# Patient Record
Sex: MALE | Race: WHITE | NOT HISPANIC OR LATINO | Employment: OTHER | URBAN - METROPOLITAN AREA
[De-identification: names, ages, dates, MRNs, and addresses within clinical notes are randomized per-mention and may not be internally consistent; named-entity substitution may affect disease eponyms.]

---

## 2023-11-13 ENCOUNTER — APPOINTMENT (OUTPATIENT)
Dept: OTOLARYNGOLOGY | Facility: CLINIC | Age: 63
End: 2023-11-13
Payer: COMMERCIAL

## 2023-12-13 ENCOUNTER — APPOINTMENT (OUTPATIENT)
Dept: DERMATOLOGY | Facility: CLINIC | Age: 63
End: 2023-12-13
Payer: COMMERCIAL

## 2024-01-15 ENCOUNTER — APPOINTMENT (OUTPATIENT)
Dept: OTOLARYNGOLOGY | Facility: CLINIC | Age: 64
End: 2024-01-15
Payer: COMMERCIAL

## 2024-01-19 ENCOUNTER — APPOINTMENT (OUTPATIENT)
Dept: AUDIOLOGY | Facility: CLINIC | Age: 64
End: 2024-01-19
Payer: COMMERCIAL

## 2024-01-24 ENCOUNTER — APPOINTMENT (OUTPATIENT)
Dept: AUDIOLOGY | Facility: CLINIC | Age: 64
End: 2024-01-24
Payer: COMMERCIAL

## 2024-02-01 ENCOUNTER — APPOINTMENT (OUTPATIENT)
Dept: AUDIOLOGY | Facility: CLINIC | Age: 64
End: 2024-02-01
Payer: COMMERCIAL

## 2024-03-11 ENCOUNTER — OFFICE VISIT (OUTPATIENT)
Dept: OTOLARYNGOLOGY | Facility: CLINIC | Age: 64
End: 2024-03-11
Payer: COMMERCIAL

## 2024-03-11 VITALS — WEIGHT: 191.6 LBS | BODY MASS INDEX: 33.95 KG/M2 | HEIGHT: 63 IN

## 2024-03-11 DIAGNOSIS — R49.0 HOARSENESS OF VOICE: Primary | ICD-10-CM

## 2024-03-11 DIAGNOSIS — J38.01 PARESIS OF RIGHT VOCAL CORD: ICD-10-CM

## 2024-03-11 DIAGNOSIS — R09.89 CHRONIC THROAT CLEARING: ICD-10-CM

## 2024-03-11 PROCEDURE — 31579 LARYNGOSCOPY TELESCOPIC: CPT | Performed by: OTOLARYNGOLOGY

## 2024-03-11 PROCEDURE — 1036F TOBACCO NON-USER: CPT | Performed by: OTOLARYNGOLOGY

## 2024-03-11 RX ORDER — ATORVASTATIN CALCIUM 80 MG/1
80 TABLET, FILM COATED ORAL
COMMUNITY
Start: 2017-10-27

## 2024-03-11 RX ORDER — TOBRAMYCIN AND DEXAMETHASONE 3; 1 MG/ML; MG/ML
1 SUSPENSION/ DROPS OPHTHALMIC
COMMUNITY
Start: 2023-07-02

## 2024-03-11 RX ORDER — ELECTROLYTES/DEXTROSE
1 SOLUTION, ORAL ORAL EVERY 24 HOURS
COMMUNITY

## 2024-03-11 RX ORDER — TIRZEPATIDE 2.5 MG/.5ML
INJECTION, SOLUTION SUBCUTANEOUS
COMMUNITY

## 2024-03-11 RX ORDER — CYCLOSPORINE 0 G/ML
1 SOLUTION/ DROPS OPHTHALMIC; TOPICAL
COMMUNITY
Start: 2023-07-23

## 2024-03-11 RX ORDER — TADALAFIL 5 MG/1
TABLET ORAL EVERY 24 HOURS
COMMUNITY

## 2024-03-11 RX ORDER — OMEGA-3-ACID ETHYL ESTERS 1 G/1
1 CAPSULE, LIQUID FILLED ORAL 2 TIMES DAILY
COMMUNITY

## 2024-03-11 RX ORDER — MONTELUKAST SODIUM 4 MG/1
1 TABLET, CHEWABLE ORAL DAILY
COMMUNITY

## 2024-03-11 RX ORDER — GUAIFENESIN 600 MG/1
600 TABLET, EXTENDED RELEASE ORAL EVERY 12 HOURS
COMMUNITY

## 2024-03-11 RX ORDER — CETIRIZINE HYDROCHLORIDE 10 MG/1
10 TABLET ORAL
COMMUNITY
Start: 2016-01-05

## 2024-03-11 RX ORDER — PREDNISONE 10 MG/1
TABLET ORAL EVERY 24 HOURS
COMMUNITY
Start: 2019-10-08

## 2024-03-11 RX ORDER — BUDESONIDE 0.5 MG/2ML
0.5 INHALANT ORAL EVERY 12 HOURS
COMMUNITY
Start: 2023-11-16

## 2024-03-11 RX ORDER — TIZANIDINE HYDROCHLORIDE 4 MG/1
4 CAPSULE, GELATIN COATED ORAL 3 TIMES DAILY
COMMUNITY
Start: 2023-07-03

## 2024-03-11 RX ORDER — ALUMINUM ZIRCONIUM OCTACHLOROHYDREX GLY 16 G/100G
GEL TOPICAL
COMMUNITY

## 2024-03-11 RX ORDER — ASCORBIC ACID 500 MG
1000 TABLET ORAL
COMMUNITY
Start: 2004-07-02

## 2024-03-11 RX ORDER — ACETAMINOPHEN 500 MG
2000 TABLET ORAL
COMMUNITY
Start: 2016-04-20

## 2024-03-11 RX ORDER — ALBUTEROL SULFATE 90 UG/1
1 AEROSOL, METERED RESPIRATORY (INHALATION)
COMMUNITY
Start: 2001-10-09

## 2024-03-11 RX ORDER — EPINEPHRINE 0.3 MG/.3ML
1 INJECTION INTRAMUSCULAR ONCE AS NEEDED
COMMUNITY
Start: 2020-10-26

## 2024-03-11 RX ORDER — DOXYCYCLINE 100 MG/1
100 CAPSULE ORAL
COMMUNITY
Start: 2022-03-17

## 2024-03-11 RX ORDER — CICLOPIROX 1 G/100ML
5 SHAMPOO TOPICAL
COMMUNITY
Start: 2015-04-02

## 2024-03-11 RX ORDER — LANOLIN ALCOHOL/MO/W.PET/CERES
1000 CREAM (GRAM) TOPICAL
COMMUNITY
Start: 2022-03-03

## 2024-03-11 RX ORDER — DICLOFENAC SODIUM 10 MG/G
2 GEL TOPICAL 2 TIMES DAILY PRN
COMMUNITY

## 2024-03-11 RX ORDER — FLUTICASONE FUROATE 27.5 UG/1
1 SPRAY, METERED NASAL EVERY 24 HOURS
COMMUNITY
Start: 2015-05-27

## 2024-03-11 RX ORDER — HYDROCODONE POLISTIREX AND CHLORPHENIRAMINE POLISTIREX 10; 8 MG/5ML; MG/5ML
5 SUSPENSION, EXTENDED RELEASE ORAL
COMMUNITY
Start: 2011-08-17

## 2024-03-11 RX ORDER — KETOCONAZOLE 20 MG/G
CREAM TOPICAL DAILY
COMMUNITY
Start: 2016-04-07

## 2024-03-11 RX ORDER — NITROGLYCERIN 0.4 MG/1
0.4 TABLET SUBLINGUAL
COMMUNITY
Start: 2017-10-28

## 2024-03-11 RX ORDER — FAMOTIDINE 40 MG/1
40 TABLET, FILM COATED ORAL
COMMUNITY

## 2024-03-11 RX ORDER — PROMETHAZINE HYDROCHLORIDE AND DEXTROMETHORPHAN HYDROBROMIDE 6.25; 15 MG/5ML; MG/5ML
SYRUP ORAL
COMMUNITY
Start: 2018-03-22

## 2024-03-11 RX ORDER — NIACIN (INOSITOL NIACINATE) 400(500MG)
CAPSULE ORAL
COMMUNITY

## 2024-03-11 RX ORDER — OMEPRAZOLE 40 MG/1
40 CAPSULE, DELAYED RELEASE ORAL
COMMUNITY

## 2024-03-11 RX ORDER — CHLORPHENIRAMINE MALEATE 4 MG
4 TABLET ORAL DAILY PRN
COMMUNITY
Start: 2022-07-19

## 2024-03-11 RX ORDER — ALBUTEROL SULFATE 0.83 MG/ML
2.5 SOLUTION RESPIRATORY (INHALATION)
COMMUNITY
Start: 2017-04-18

## 2024-03-11 RX ORDER — AMMONIUM LACTATE 12 G/100G
LOTION TOPICAL AS NEEDED
COMMUNITY
Start: 2023-11-24

## 2024-03-11 RX ORDER — BUMETANIDE 2 MG/1
TABLET ORAL
COMMUNITY

## 2024-03-11 RX ORDER — MONTELUKAST SODIUM 10 MG/1
10 TABLET ORAL NIGHTLY
COMMUNITY
Start: 2015-01-12

## 2024-03-11 RX ORDER — IPRATROPIUM BROMIDE AND ALBUTEROL SULFATE 2.5; .5 MG/3ML; MG/3ML
SOLUTION RESPIRATORY (INHALATION) EVERY 4 HOURS
COMMUNITY
Start: 2023-11-24

## 2024-03-11 RX ORDER — BENZONATATE 200 MG/1
200 CAPSULE ORAL 3 TIMES DAILY PRN
COMMUNITY
Start: 2022-03-17

## 2024-03-11 ASSESSMENT — PATIENT HEALTH QUESTIONNAIRE - PHQ9
SUM OF ALL RESPONSES TO PHQ9 QUESTIONS 1 AND 2: 0
1. LITTLE INTEREST OR PLEASURE IN DOING THINGS: NOT AT ALL
2. FEELING DOWN, DEPRESSED OR HOPELESS: NOT AT ALL

## 2024-03-11 NOTE — PROGRESS NOTES
Chief Complaint  Chief Complaint   Patient presents with    Follow-up     6-8 follow up visit.        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     Interval History (2023):   Since last visit he had COVID in 2024. He has persistent fatigue due to this. He had a concussion in 2023. He left his job as a . At this time, he has noticed low voice demand. He is using a microphone when he talks. He has persistent hoarseness and pitch breaks in the upper registers when singing. He is not sure if this is due to COVID or his baseline voice issues.    He has postnasal drip. He is performing speech therapy techniques and seltzer gargles. When the secretions are severe, he is able to expectorate this.   He had an EGD.     Exam:  VOICE: Mild hoarseness near normal   RESPIRATION: Breathing comfortably, no stridor.    ORAL CAVITY/OROPHARYNX/LIPS: Normal mucous membranes, normal floor of mouth/tongue/OP, no masses or lesions are noted.    SKIN: Neck skin is without scar or injury.    PSYCH: Alert and oriented with appropriate mood and affect.       PROCEDURE NOTE:  Recommended stroboscopy.  Risks, benefits,  and alternatives were explained. They wished to proceed and provides verbal consent.     PROCEDURE:  Flexible laryngoscopy with stroboscopy, CPT 49682     POSTPROCEDURE DIAGNOSIS: Hoarseness     INDICATIONS: Inability to tolerate mirror exam or abnormal findings on mirror,Stroboscopy performed to assess one of the followin. Diagnosis of symptomatic disorder involving the voice, swallow, upper aerodigestive tract, including VALERIE disorders, or  2. Preoperative evaluation of vocal cord function for individuals undergoing surgery where the RLN or vagus nerves are at risk of injury, or  3. Further evaluation of abnormalities of the upper aerodigestive tract discovered by another modality, such as CT, MRI, bronchoscopy or EGD    Description of Procedure:    After  adequate afrin and lidocaine spray, I advanced the endoscope.  Visualization of the nasopharynx, vallecula, posterior pharyngeal walls, pyriform, epiglottis and post cricoid areas was unremarkable.  The following laryngeal findings were noted:    Vocal cord movement is asymmetric, right is improved  blunted posterior pharyngeal wall likely from a spur/osteophyte   Closure is complete   Posterior vocal cord erythema is improved  Mucosal wave amplitude is increased on the R > L- this is improved from prior   Periodic/symmetric mucosal wave   Pharyngeal wall contraction is normal   No pooling of secretions     Procedure well tolerated.    Assessment and Plan:  This is a follow up visit for chronic hoarseness with clinical findings of a subclinical right vocal cord weakness and posterior vocal cord edema with compensatory MTD.  Today's exam is unchanged compared to his last one. This is assuring that his current symptoms are likely exacerbated and secondary to postviral effect. I am overall encouraged by the     We discussed:  He will work with SLP on reconditioning for his singing. At this time, there are no limitations in his voice. He was advised to sing in a register where he is comfortable without exerting effort.   He was advised to start gargling with seltzer water and increase hydration as the day goes on.   He will follow up as needed.     The patient's questions were answered.    Scribe Attestation  By signing my name below, I, Daniella Lopez , Scribe attest that this documentation has been prepared under the direction and in the presence of Amie Duarte MD.

## 2024-03-18 ENCOUNTER — APPOINTMENT (OUTPATIENT)
Dept: SPEECH THERAPY | Facility: HOSPITAL | Age: 64
End: 2024-03-18
Payer: COMMERCIAL

## 2024-03-29 ENCOUNTER — CLINICAL SUPPORT (OUTPATIENT)
Dept: AUDIOLOGY | Facility: CLINIC | Age: 64
End: 2024-03-29
Payer: COMMERCIAL

## 2024-03-29 DIAGNOSIS — H90.3 SENSORINEURAL HEARING LOSS, BILATERAL: Primary | ICD-10-CM

## 2024-03-29 PROCEDURE — V5299 HEARING SERVICE: HCPCS | Performed by: AUDIOLOGIST

## 2024-03-29 NOTE — PROGRESS NOTES
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 Also needs a clean and check.    EVALUATION  Otoscopy revealed clear ear canals and tympanic membrane visualized, bilaterally.    Updated software in [employer], changed receivers from MAV to 0 moderate. 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    IMPRESSIONS:  Cleaned hearing aids changed receivers listening check good. Consider increased gain if acceptable to patient. 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    RECOMMENDATIONS:  Continue medical follow-up with physician.  Return for audiologic assessment in conjunction with otologic care or annually.   Return for hearing aid check as needed.    PATIENT EDUCATION:   Discussed results and recommendations with Wilbert Lobo.  Questions were addressed and the patient was encouraged to contact our department ([phone]) should concerns arise.    RAFIA Ruiz, CCC-A  Senior Clinical Audiologist    TIME: [phone]

## 2024-04-26 ENCOUNTER — CLINICAL SUPPORT (OUTPATIENT)
Dept: AUDIOLOGY | Facility: CLINIC | Age: 64
End: 2024-04-26
Payer: COMMERCIAL

## 2024-04-26 DIAGNOSIS — H90.3 SENSORINEURAL HEARING LOSS, BILATERAL: Primary | ICD-10-CM

## 2024-04-26 PROCEDURE — V5299 HEARING SERVICE: HCPCS | Performed by: AUDIOLOGIST

## 2024-04-26 NOTE — PROGRESS NOTES
HA CHECK     Ear Make and Model Serial Number /  Tube size Dome Warranty Expiration   Right Phonak Audeo L90 R [id number]  0 moderate medium open 11/26/2025   Left Phonak Audeo L90 R [id number] 0 moderate medium open 11/26/2025       HISTORY  Patient arrived today for hearing aid check reporting needing more treble in streaming programs (music sounds dull), continued difficulty hearing soft speech in autosense program. And left device occasionally working out of the canal.    EVALUATION  Otoscopy revealed clear ear canals and tympanic membrane visualized, bilaterally.    Increased mid and high frequencies in streaming and autosense programs. Decreased gain for 80 dB inputs as his voice is sounding distorted. Added power domes to use while streaming music to get the best sound quality. Added retention line on left device.    IMPRESSIONS:  Increased gain in mid and high frequencies for streaming and autosense program. Use power domes when streaming to allow for more access to the full range of frequencies of music/sound. Can adjust frequencies in ayaka in custom programs but not in streaming programs.     RECOMMENDATIONS:  Continue medical follow-up with physician.  Return for audiologic assessment in conjunction with otologic care or annually.   Return for hearing aid check as needed.    PATIENT EDUCATION:   Discussed results and recommendations with Wilbert Lobo.  Questions were addressed and the patient was encouraged to contact our department ([phone]) should concerns arise.    RAFIA Ruiz, CCC-A  Senior Clinical Audiologist    TIME: [phone]

## 2024-06-25 PROBLEM — R93.3 ABNORMAL UGI SERIES: Status: ACTIVE | Noted: 2024-03-02

## 2024-06-25 PROBLEM — E78.00 HIGH CHOLESTEROL: Status: ACTIVE | Noted: 2019-08-29

## 2024-06-25 PROBLEM — J38.4 VOCAL CORD EDEMA: Status: ACTIVE | Noted: 2024-01-31

## 2024-06-25 PROBLEM — I51.9 HEART DISEASE: Status: ACTIVE | Noted: 2024-01-31

## 2024-06-25 PROBLEM — J30.1 ALLERGIC RHINITIS DUE TO POLLEN: Status: ACTIVE | Noted: 2024-01-31

## 2024-06-25 PROBLEM — R53.83 FATIGUE: Status: ACTIVE | Noted: 2024-01-31

## 2024-06-25 PROBLEM — H93.13 TINNITUS OF BOTH EARS: Status: ACTIVE | Noted: 2024-01-31

## 2024-06-25 PROBLEM — K29.30 CHRONIC SUPERFICIAL GASTRITIS WITHOUT BLEEDING: Status: ACTIVE | Noted: 2024-03-02

## 2024-06-25 PROBLEM — M35.9 AUTOIMMUNE DISEASE (MULTI): Status: ACTIVE | Noted: 2024-01-31

## 2024-06-25 PROBLEM — K57.10 DUODENAL DIVERTICULUM: Status: ACTIVE | Noted: 2024-03-05

## 2024-06-25 RX ORDER — METFORMIN HYDROCHLORIDE 500 MG/1
1000 TABLET, EXTENDED RELEASE ORAL 2 TIMES DAILY
COMMUNITY
Start: 2024-04-18

## 2024-07-18 ENCOUNTER — OFFICE VISIT (OUTPATIENT)
Dept: RHEUMATOLOGY | Facility: CLINIC | Age: 64
End: 2024-07-18
Payer: COMMERCIAL

## 2024-07-18 VITALS
HEIGHT: 63 IN | BODY MASS INDEX: 35.43 KG/M2 | OXYGEN SATURATION: 96 % | HEART RATE: 67 BPM | SYSTOLIC BLOOD PRESSURE: 101 MMHG | WEIGHT: 199.96 LBS | DIASTOLIC BLOOD PRESSURE: 61 MMHG

## 2024-07-18 DIAGNOSIS — R53.83 FATIGUE, UNSPECIFIED TYPE: ICD-10-CM

## 2024-07-18 DIAGNOSIS — E55.9 VITAMIN D DEFICIENCY: ICD-10-CM

## 2024-07-18 DIAGNOSIS — M06.09 POLYARTHRITIS WITH NEGATIVE RHEUMATOID FACTOR (MULTI): ICD-10-CM

## 2024-07-18 DIAGNOSIS — R76.8 POSITIVE ANA (ANTINUCLEAR ANTIBODY): Primary | ICD-10-CM

## 2024-07-18 PROCEDURE — 99215 OFFICE O/P EST HI 40 MIN: CPT | Performed by: INTERNAL MEDICINE

## 2024-07-18 PROCEDURE — 3008F BODY MASS INDEX DOCD: CPT | Performed by: INTERNAL MEDICINE

## 2024-07-18 PROCEDURE — 99205 OFFICE O/P NEW HI 60 MIN: CPT | Performed by: INTERNAL MEDICINE

## 2024-07-18 RX ORDER — ASPIRIN 81 MG/1
81 TABLET ORAL DAILY
COMMUNITY

## 2024-07-18 RX ORDER — LIFITEGRAST 50 MG/ML
1 SOLUTION/ DROPS OPHTHALMIC 2 TIMES DAILY
COMMUNITY

## 2024-07-18 RX ORDER — DICLOFENAC SODIUM 30 MG/G
GEL TOPICAL 2 TIMES DAILY PRN
Qty: 100 G | Refills: 5 | Status: SHIPPED | OUTPATIENT
Start: 2024-07-18

## 2024-07-18 RX ORDER — SULFASALAZINE 500 MG/1
500 TABLET, DELAYED RELEASE ORAL 2 TIMES DAILY
Qty: 60 TABLET | Refills: 5 | Status: SHIPPED | OUTPATIENT
Start: 2024-07-18 | End: 2024-07-19

## 2024-07-18 ASSESSMENT — ROUTINE ASSESSMENT OF PATIENT INDEX DATA (RAPID3)
TOTAL RAPID3 SCORE: 11.7
SEVERITY_SCORE: 0
SUM OF QUESTIONS A TO J: 8
ON A SCALE OF ONE TO TEN, CONSIDERING ALL THE WAYS IN WHICH ILLNESS AND HEALTH CONDITIONS MAY AFFECT YOU AT THIS TIME, PLEASE INDICATE BELOW HOW YOU ARE DOING:: 5
PARTIPATE_RECREATIONAL_ACTIVITIES: WITH MUCH DIFFICULTY
FN_SCORE: 2.7
WEIGHTED_TOTAL_SCORE: 3.9
PICK_CLOTHES_OFF_FLOOR: WITH MUCH DIFFICULTY
WASH_DRY_BODY: WITH SOME DIFFICULTY
ON A SCALE OF ONE TO TEN, HOW MUCH PAIN HAVE YOU HAD BECAUSE OF YOUR CONDITION OVER THE PAST WEEK?: 4
LIFT_CUP_TO_MOUTH: WITHOUT ANY DIFFICULTY
ON A SCALE OF ONE TO TEN, HOW MUCH PAIN HAVE YOU HAD BECAUSE OF YOUR CONDITION OVER THE PAST WEEK?: 4
FEELINGS_ANXIETY_NERVOUS: WITHOUT ANY DIFFICULTY
WALK_FLAT_GROUND: WITHOUT ANY DIFFICULTY
ON A SCALE OF ONE TO TEN, CONSIDERING ALL THE WAYS IN WHICH ILLNESS AND HEALTH CONDITIONS MAY AFFECT YOU AT THIS TIME, PLEASE INDICATE BELOW HOW YOU ARE DOING:: 5
WALK_KILOMETERS: WITH MUCH DIFFICULTY
FEELINGS_DEPRESSION: WITHOUT ANY DIFFICULTY
IN_OUT_TRANSPORT: WITHOUT ANY DIFFICULTY
IN_OUT_BED: WITHOUT ANY DIFFICULTY
TURN_FAUCETS_OFF: WITHOUT ANY DIFFICULTY
GOOD_NIGHTS_SLEEP: WITH SOME DIFFICULTY
SEVERITY_SCORE: MODERATE SEVERITY (MS)
DRESS_YOURSELF: WITH SOME DIFFICULTY

## 2024-07-18 ASSESSMENT — PAIN - FUNCTIONAL ASSESSMENT: PAIN_FUNCTIONAL_ASSESSMENT: 0-10

## 2024-07-18 ASSESSMENT — PATIENT HEALTH QUESTIONNAIRE - PHQ9
1. LITTLE INTEREST OR PLEASURE IN DOING THINGS: NOT AT ALL
SUM OF ALL RESPONSES TO PHQ9 QUESTIONS 1 AND 2: 0
2. FEELING DOWN, DEPRESSED OR HOPELESS: NOT AT ALL

## 2024-07-18 ASSESSMENT — ENCOUNTER SYMPTOMS
LOSS OF SENSATION IN FEET: 0
DEPRESSION: 0
OCCASIONAL FEELINGS OF UNSTEADINESS: 0

## 2024-07-18 ASSESSMENT — PAIN SCALES - GENERAL: PAINLEVEL_OUTOF10: 4
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
## 2024-07-19 RX ORDER — SULFASALAZINE 500 MG/1
TABLET, DELAYED RELEASE ORAL
Qty: 180 TABLET | Refills: 3 | Status: SHIPPED | OUTPATIENT
Start: 2024-07-19
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
## 2024-08-12 ENCOUNTER — APPOINTMENT (OUTPATIENT)
Dept: OTOLARYNGOLOGY | Facility: CLINIC | Age: 64
End: 2024-08-12
Payer: COMMERCIAL

## 2024-08-12 VITALS — BODY MASS INDEX: 33.8 KG/M2 | HEIGHT: 64 IN | WEIGHT: 198 LBS

## 2024-08-12 DIAGNOSIS — R09.89 CHRONIC THROAT CLEARING: ICD-10-CM

## 2024-08-12 DIAGNOSIS — K21.9 GASTROESOPHAGEAL REFLUX DISEASE WITHOUT ESOPHAGITIS: ICD-10-CM

## 2024-08-12 DIAGNOSIS — J30.1 SEASONAL ALLERGIC RHINITIS DUE TO POLLEN: ICD-10-CM

## 2024-08-12 DIAGNOSIS — H93.13 TINNITUS OF BOTH EARS: ICD-10-CM

## 2024-08-12 DIAGNOSIS — J38.01 PARESIS OF RIGHT VOCAL CORD: Primary | ICD-10-CM

## 2024-08-12 DIAGNOSIS — R49.0 HOARSENESS OF VOICE: ICD-10-CM

## 2024-08-12 PROBLEM — D17.21 BENIGN LIPOMATOUS NEOPLASM OF SKIN AND SUBCUTANEOUS TISSUE OF RIGHT ARM: Status: ACTIVE | Noted: 2020-11-03

## 2024-08-12 PROBLEM — G25.81 RESTLESS LEGS: Status: ACTIVE | Noted: 2024-08-12

## 2024-08-12 PROBLEM — K50.90 CROHN'S DISEASE (MULTI): Status: ACTIVE | Noted: 2017-10-24

## 2024-08-12 PROBLEM — B02.9 HERPES ZOSTER WITHOUT COMPLICATION: Status: ACTIVE | Noted: 2017-10-25

## 2024-08-12 PROBLEM — J45.20 MILD INTERMITTENT ASTHMA (HHS-HCC): Status: ACTIVE | Noted: 2024-01-31

## 2024-08-12 PROBLEM — L29.9 PRURITUS, UNSPECIFIED: Status: ACTIVE | Noted: 2020-11-03

## 2024-08-12 PROBLEM — D48.5 NEOPLASM OF UNCERTAIN BEHAVIOR OF SKIN: Status: ACTIVE | Noted: 2020-11-03

## 2024-08-12 PROBLEM — M54.50 LOW BACK PAIN: Status: ACTIVE | Noted: 2018-10-30

## 2024-08-12 PROBLEM — N52.1 ERECTILE DYSFUNCTION DUE TO DISEASES CLASSIFIED ELSEWHERE: Status: ACTIVE | Noted: 2022-12-12

## 2024-08-12 PROBLEM — I25.2 HISTORY OF MYOCARDIAL INFARCTION: Status: ACTIVE | Noted: 2024-01-31

## 2024-08-12 PROBLEM — H90.3 ASYMMETRICAL SENSORINEURAL HEARING LOSS: Status: ACTIVE | Noted: 2024-08-12

## 2024-08-12 PROBLEM — K50.90 ARTHRITIS IN CROHN'S DISEASE (MULTI): Status: ACTIVE | Noted: 2018-01-31

## 2024-08-12 PROBLEM — D84.9 IMMUNOCOMPROMISED STATE (MULTI): Status: ACTIVE | Noted: 2018-01-31

## 2024-08-12 PROBLEM — Z98.890 HISTORY OF NISSEN FUNDOPLICATION: Status: ACTIVE | Noted: 2024-03-02

## 2024-08-12 PROBLEM — R73.03 PRE-DIABETES: Status: ACTIVE | Noted: 2021-10-18

## 2024-08-12 PROBLEM — I83.028: Status: ACTIVE | Noted: 2020-11-03

## 2024-08-12 PROBLEM — Z86.69 HISTORY OF HEARING LOSS: Status: ACTIVE | Noted: 2024-08-12

## 2024-08-12 PROBLEM — Z91.013: Status: ACTIVE | Noted: 2018-03-25

## 2024-08-12 PROBLEM — H61.20 EXCESSIVE CERUMEN IN EAR CANAL: Status: ACTIVE | Noted: 2024-08-12

## 2024-08-12 PROBLEM — L60.3 NAIL DYSTROPHY: Status: ACTIVE | Noted: 2020-11-03

## 2024-08-12 PROBLEM — I87.2 CHRONIC VENOUS INSUFFICIENCY: Status: ACTIVE | Noted: 2020-11-03

## 2024-08-12 PROBLEM — S00.432A: Status: ACTIVE | Noted: 2024-08-12

## 2024-08-12 PROBLEM — J45.40 MODERATE PERSISTENT ASTHMA WITHOUT COMPLICATION (HHS-HCC): Status: ACTIVE | Noted: 2018-03-25

## 2024-08-12 PROBLEM — E66.9 OBESITY WITH BODY MASS INDEX 30 OR GREATER: Status: ACTIVE | Noted: 2024-08-12

## 2024-08-12 PROBLEM — I10 ESSENTIAL HYPERTENSION: Status: ACTIVE | Noted: 2020-10-09

## 2024-08-12 PROBLEM — E66.01 MORBID OBESITY DUE TO EXCESS CALORIES (MULTI): Status: ACTIVE | Noted: 2022-11-03

## 2024-08-12 PROBLEM — H72.92 PERFORATION OF LEFT TYMPANIC MEMBRANE: Status: ACTIVE | Noted: 2024-01-31

## 2024-08-12 PROBLEM — L29.1 SCROTAL ITCHING: Status: ACTIVE | Noted: 2022-11-30

## 2024-08-12 PROBLEM — H61.22 IMPACTED CERUMEN OF LEFT EAR: Status: ACTIVE | Noted: 2024-01-31

## 2024-08-12 PROBLEM — I87.2 CHRONIC STASIS DERMATITIS: Status: ACTIVE | Noted: 2019-05-01

## 2024-08-12 PROBLEM — M07.60 ARTHRITIS IN CROHN'S DISEASE (MULTI): Status: ACTIVE | Noted: 2018-01-31

## 2024-08-12 PROBLEM — M85.80 OSTEOPENIA DETERMINED BY X-RAY: Status: ACTIVE | Noted: 2018-01-31

## 2024-08-12 PROBLEM — D22.5 MELANOCYTIC NEVI OF TRUNK: Status: ACTIVE | Noted: 2020-11-03

## 2024-08-12 PROBLEM — I21.4 NSTEMI (NON-ST ELEVATED MYOCARDIAL INFARCTION) (MULTI): Status: ACTIVE | Noted: 2017-10-25

## 2024-08-12 PROBLEM — R33.9 URINARY RETENTION: Status: ACTIVE | Noted: 2022-11-30

## 2024-08-12 PROBLEM — R39.12 WEAK URINARY STREAM: Status: ACTIVE | Noted: 2021-01-23

## 2024-08-12 PROBLEM — J34.89 NASAL DISCHARGE: Status: ACTIVE | Noted: 2024-01-31

## 2024-08-12 PROBLEM — R07.9 CHEST PAIN: Status: ACTIVE | Noted: 2017-10-24

## 2024-08-12 PROBLEM — R30.0 DYSURIA: Status: ACTIVE | Noted: 2021-01-23

## 2024-08-12 PROBLEM — J30.9 ALLERGIC RHINITIS: Status: ACTIVE | Noted: 2018-03-25

## 2024-08-12 PROCEDURE — 1036F TOBACCO NON-USER: CPT | Performed by: OTOLARYNGOLOGY

## 2024-08-12 PROCEDURE — 3008F BODY MASS INDEX DOCD: CPT | Performed by: OTOLARYNGOLOGY

## 2024-08-12 PROCEDURE — 99214 OFFICE O/P EST MOD 30 MIN: CPT | Performed by: OTOLARYNGOLOGY

## 2024-08-22 ENCOUNTER — OFFICE VISIT (OUTPATIENT)
Dept: OTOLARYNGOLOGY | Facility: CLINIC | Age: 64
End: 2024-08-22
Payer: COMMERCIAL

## 2024-08-22 VITALS — HEIGHT: 63 IN | BODY MASS INDEX: 33.66 KG/M2 | WEIGHT: 190 LBS

## 2024-08-22 DIAGNOSIS — R04.0 EPISTAXIS: Primary | ICD-10-CM

## 2024-08-22 DIAGNOSIS — J34.89 NASAL LESION: ICD-10-CM

## 2024-08-22 PROCEDURE — 3008F BODY MASS INDEX DOCD: CPT | Performed by: STUDENT IN AN ORGANIZED HEALTH CARE EDUCATION/TRAINING PROGRAM

## 2024-08-22 PROCEDURE — 99214 OFFICE O/P EST MOD 30 MIN: CPT | Performed by: STUDENT IN AN ORGANIZED HEALTH CARE EDUCATION/TRAINING PROGRAM

## 2024-08-22 PROCEDURE — 31231 NASAL ENDOSCOPY DX: CPT | Performed by: STUDENT IN AN ORGANIZED HEALTH CARE EDUCATION/TRAINING PROGRAM

## 2024-08-22 PROCEDURE — 1036F TOBACCO NON-USER: CPT | Performed by: STUDENT IN AN ORGANIZED HEALTH CARE EDUCATION/TRAINING PROGRAM

## 2024-08-22 RX ORDER — AMOXICILLIN 500 MG/1
1 CAPSULE ORAL
COMMUNITY
Start: 2024-08-20

## 2024-08-22 RX ORDER — MUPIROCIN 20 MG/G
OINTMENT TOPICAL
Qty: 22 G | Refills: 0 | Status: SHIPPED | OUTPATIENT
Start: 2024-08-22 | End: 2024-09-01
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
## 2024-09-20 ENCOUNTER — APPOINTMENT (OUTPATIENT)
Dept: AUDIOLOGY | Facility: CLINIC | Age: 64
End: 2024-09-20
Payer: COMMERCIAL

## 2024-09-23 ENCOUNTER — APPOINTMENT (OUTPATIENT)
Dept: OTOLARYNGOLOGY | Facility: CLINIC | Age: 64
End: 2024-09-23
Payer: COMMERCIAL

## 2024-10-07 ENCOUNTER — APPOINTMENT (OUTPATIENT)
Dept: OTOLARYNGOLOGY | Facility: CLINIC | Age: 64
End: 2024-10-07
Payer: COMMERCIAL

## 2024-10-07 VITALS — WEIGHT: 190 LBS | HEIGHT: 63 IN | BODY MASS INDEX: 33.66 KG/M2

## 2024-10-07 DIAGNOSIS — R04.0 EPISTAXIS: Primary | ICD-10-CM

## 2024-10-07 DIAGNOSIS — J31.0 CHRONIC RHINITIS: ICD-10-CM

## 2024-10-07 DIAGNOSIS — H90.12 CONDUCTIVE HEARING LOSS OF LEFT EAR, UNSPECIFIED HEARING STATUS ON CONTRALATERAL SIDE: ICD-10-CM

## 2024-10-07 DIAGNOSIS — H61.22 IMPACTED CERUMEN OF LEFT EAR: ICD-10-CM

## 2024-10-07 PROCEDURE — 99213 OFFICE O/P EST LOW 20 MIN: CPT | Performed by: STUDENT IN AN ORGANIZED HEALTH CARE EDUCATION/TRAINING PROGRAM

## 2024-10-07 PROCEDURE — 3008F BODY MASS INDEX DOCD: CPT | Performed by: STUDENT IN AN ORGANIZED HEALTH CARE EDUCATION/TRAINING PROGRAM

## 2024-10-07 PROCEDURE — 69210 REMOVE IMPACTED EAR WAX UNI: CPT | Performed by: STUDENT IN AN ORGANIZED HEALTH CARE EDUCATION/TRAINING PROGRAM

## 2024-10-07 PROCEDURE — 1159F MED LIST DOCD IN RCRD: CPT | Performed by: STUDENT IN AN ORGANIZED HEALTH CARE EDUCATION/TRAINING PROGRAM

## 2024-10-07 PROCEDURE — 1036F TOBACCO NON-USER: CPT | Performed by: STUDENT IN AN ORGANIZED HEALTH CARE EDUCATION/TRAINING PROGRAM
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
## 2024-10-18 ENCOUNTER — APPOINTMENT (OUTPATIENT)
Dept: AUDIOLOGY | Facility: CLINIC | Age: 64
End: 2024-10-18
Payer: COMMERCIAL

## 2024-10-18 DIAGNOSIS — H90.3 SENSORINEURAL HEARING LOSS, BILATERAL: Primary | ICD-10-CM

## 2024-10-18 PROCEDURE — 92567 TYMPANOMETRY: CPT | Performed by: AUDIOLOGIST

## 2024-10-18 PROCEDURE — V5267 HEARING AID SUP/ACCESS/DEV: HCPCS | Performed by: AUDIOLOGIST

## 2024-10-18 PROCEDURE — 92557 COMPREHENSIVE HEARING TEST: CPT | Performed by: AUDIOLOGIST
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
## 2024-11-04 ENCOUNTER — TELEPHONE (OUTPATIENT)
Dept: RHEUMATOLOGY | Facility: CLINIC | Age: 64
End: 2024-11-04

## 2024-11-04 NOTE — TELEPHONE ENCOUNTER
Pt has an upcoming appointment scheduled on 11/22/2024.  Pt had labs last drawn on 07/30/2024.  Should the pt have more labs drawn before next appointment?  Please advise.     Maxim Cummins MA

## 2024-11-20 ENCOUNTER — APPOINTMENT (OUTPATIENT)
Dept: OTOLARYNGOLOGY | Facility: CLINIC | Age: 64
End: 2024-11-20
Payer: COMMERCIAL

## 2024-11-20 VITALS — WEIGHT: 200 LBS | BODY MASS INDEX: 35.44 KG/M2 | HEIGHT: 63 IN

## 2024-11-20 DIAGNOSIS — H90.3 SENSORINEURAL HEARING LOSS (SNHL) OF BOTH EARS: ICD-10-CM

## 2024-11-20 DIAGNOSIS — J31.0 CHRONIC RHINITIS: Primary | ICD-10-CM

## 2024-11-20 PROCEDURE — 1036F TOBACCO NON-USER: CPT | Performed by: STUDENT IN AN ORGANIZED HEALTH CARE EDUCATION/TRAINING PROGRAM

## 2024-11-20 PROCEDURE — 3008F BODY MASS INDEX DOCD: CPT | Performed by: STUDENT IN AN ORGANIZED HEALTH CARE EDUCATION/TRAINING PROGRAM

## 2024-11-20 PROCEDURE — 1159F MED LIST DOCD IN RCRD: CPT | Performed by: STUDENT IN AN ORGANIZED HEALTH CARE EDUCATION/TRAINING PROGRAM

## 2024-11-20 PROCEDURE — 99213 OFFICE O/P EST LOW 20 MIN: CPT | Performed by: STUDENT IN AN ORGANIZED HEALTH CARE EDUCATION/TRAINING PROGRAM

## 2024-11-20 RX ORDER — IPRATROPIUM BROMIDE 42 UG/1
2 SPRAY, METERED NASAL 3 TIMES DAILY PRN
Qty: 30 ML | Refills: 3 | Status: SHIPPED | OUTPATIENT
Start: 2024-11-20
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
## 2024-11-22 ENCOUNTER — OFFICE VISIT (OUTPATIENT)
Dept: RHEUMATOLOGY | Facility: CLINIC | Age: 64
End: 2024-11-22
Payer: COMMERCIAL

## 2024-11-22 VITALS
DIASTOLIC BLOOD PRESSURE: 54 MMHG | SYSTOLIC BLOOD PRESSURE: 110 MMHG | HEIGHT: 63 IN | BODY MASS INDEX: 36.5 KG/M2 | WEIGHT: 206 LBS | OXYGEN SATURATION: 95 % | HEART RATE: 63 BPM

## 2024-11-22 DIAGNOSIS — M06.09 POLYARTHRITIS WITH NEGATIVE RHEUMATOID FACTOR (MULTI): Primary | ICD-10-CM

## 2024-11-22 PROCEDURE — 99214 OFFICE O/P EST MOD 30 MIN: CPT | Performed by: INTERNAL MEDICINE

## 2024-11-22 PROCEDURE — 3008F BODY MASS INDEX DOCD: CPT | Performed by: INTERNAL MEDICINE

## 2024-11-22 PROCEDURE — 3078F DIAST BP <80 MM HG: CPT | Performed by: INTERNAL MEDICINE

## 2024-11-22 PROCEDURE — 1159F MED LIST DOCD IN RCRD: CPT | Performed by: INTERNAL MEDICINE

## 2024-11-22 PROCEDURE — 3074F SYST BP LT 130 MM HG: CPT | Performed by: INTERNAL MEDICINE

## 2024-11-22 PROCEDURE — 1160F RVW MEDS BY RX/DR IN RCRD: CPT | Performed by: INTERNAL MEDICINE

## 2024-11-22 PROCEDURE — 1125F AMNT PAIN NOTED PAIN PRSNT: CPT | Performed by: INTERNAL MEDICINE

## 2024-11-22 ASSESSMENT — ROUTINE ASSESSMENT OF PATIENT INDEX DATA (RAPID3)
FEELINGS_DEPRESSION: WITHOUT ANY DIFFICULTY
PICK_CLOTHES_OFF_FLOOR: WITH MUCH DIFFICULTY
TOTAL RAPID3 SCORE: 10.3
ON A SCALE OF ONE TO TEN, HOW MUCH PAIN HAVE YOU HAD BECAUSE OF YOUR CONDITION OVER THE PAST WEEK?: 3
WALK_KILOMETERS: WITH MUCH DIFFICULTY
ON A SCALE OF ONE TO TEN, CONSIDERING ALL THE WAYS IN WHICH ILLNESS AND HEALTH CONDITIONS MAY AFFECT YOU AT THIS TIME, PLEASE INDICATE BELOW HOW YOU ARE DOING:: 4
IN_OUT_BED: WITH SOME DIFFICULTY
IN_OUT_TRANSPORT: WITH SOME DIFFICULTY
FN_SCORE: 3.3
DRESS_YOURSELF: WITH SOME DIFFICULTY
SEVERITY_SCORE: 0
ON A SCALE OF ONE TO TEN, CONSIDERING ALL THE WAYS IN WHICH ILLNESS AND HEALTH CONDITIONS MAY AFFECT YOU AT THIS TIME, PLEASE INDICATE BELOW HOW YOU ARE DOING:: 4
WEIGHTED_TOTAL_SCORE: 3.43
ON A SCALE OF ONE TO TEN, HOW MUCH PAIN HAVE YOU HAD BECAUSE OF YOUR CONDITION OVER THE PAST WEEK?: 3
LIFT_CUP_TO_MOUTH: WITHOUT ANY DIFFICULTY
WALK_FLAT_GROUND: WITHOUT ANY DIFFICULTY
TURN_FAUCETS_OFF: WITHOUT ANY DIFFICULTY
FEELINGS_ANXIETY_NERVOUS: WITHOUT ANY DIFFICULTY
GOOD_NIGHTS_SLEEP: WITH SOME DIFFICULTY
PARTIPATE_RECREATIONAL_ACTIVITIES: WITH MUCH DIFFICULTY
SUM OF QUESTIONS A TO J: 10
SEVERITY_SCORE: MODERATE SEVERITY (MS)
WASH_DRY_BODY: WITH SOME DIFFICULTY

## 2024-11-22 ASSESSMENT — ENCOUNTER SYMPTOMS
LOSS OF SENSATION IN FEET: 0
DEPRESSION: 0
OCCASIONAL FEELINGS OF UNSTEADINESS: 0

## 2024-11-22 ASSESSMENT — PATIENT HEALTH QUESTIONNAIRE - PHQ9
2. FEELING DOWN, DEPRESSED OR HOPELESS: NOT AT ALL
1. LITTLE INTEREST OR PLEASURE IN DOING THINGS: NOT AT ALL
SUM OF ALL RESPONSES TO PHQ9 QUESTIONS 1 AND 2: 0

## 2024-11-22 ASSESSMENT — PAIN SCALES - GENERAL: PAINLEVEL_OUTOF10: 3

## 2024-11-22 NOTE — PATIENT INSTRUCTIONS
Magnesium glycinate 400 to 500 mg twice daily.    Can start with lower dose and increase as tolerated.   If you get diarrhea, decrease dose.
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
## 2025-02-10 ENCOUNTER — APPOINTMENT (OUTPATIENT)
Dept: OTOLARYNGOLOGY | Facility: CLINIC | Age: 65
End: 2025-02-10
Payer: COMMERCIAL

## 2025-03-11 ENCOUNTER — APPOINTMENT (OUTPATIENT)
Dept: OTOLARYNGOLOGY | Facility: CLINIC | Age: 65
End: 2025-03-11
Payer: COMMERCIAL

## 2025-04-18 ENCOUNTER — OFFICE VISIT (OUTPATIENT)
Dept: OTOLARYNGOLOGY | Facility: CLINIC | Age: 65
End: 2025-04-18
Payer: COMMERCIAL

## 2025-04-18 ENCOUNTER — APPOINTMENT (OUTPATIENT)
Dept: AUDIOLOGY | Facility: CLINIC | Age: 65
End: 2025-04-18
Payer: COMMERCIAL

## 2025-04-18 DIAGNOSIS — H90.3 SENSORINEURAL HEARING LOSS, BILATERAL: Primary | ICD-10-CM

## 2025-04-18 DIAGNOSIS — J31.0 CHRONIC RHINITIS: Primary | ICD-10-CM

## 2025-04-18 DIAGNOSIS — H90.3 SENSORINEURAL HEARING LOSS (SNHL) OF BOTH EARS: ICD-10-CM

## 2025-04-18 DIAGNOSIS — R04.0 EPISTAXIS: ICD-10-CM

## 2025-04-18 PROCEDURE — 99213 OFFICE O/P EST LOW 20 MIN: CPT | Performed by: STUDENT IN AN ORGANIZED HEALTH CARE EDUCATION/TRAINING PROGRAM

## 2025-04-18 PROCEDURE — 1159F MED LIST DOCD IN RCRD: CPT | Performed by: STUDENT IN AN ORGANIZED HEALTH CARE EDUCATION/TRAINING PROGRAM

## 2025-04-18 PROCEDURE — 1036F TOBACCO NON-USER: CPT | Performed by: STUDENT IN AN ORGANIZED HEALTH CARE EDUCATION/TRAINING PROGRAM

## 2025-04-18 PROCEDURE — V5267 HEARING AID SUP/ACCESS/DEV: HCPCS | Performed by: AUDIOLOGIST

## 2025-04-18 RX ORDER — VONOPRAZAN FUMARATE 26.72 MG/1
10 TABLET ORAL DAILY
COMMUNITY
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
## 2025-04-18 NOTE — PROGRESS NOTES
"AUDIOMETRIC EVALUATION       Name:  Wilbert Lobo \"Bart\"  :  1960  Age:  64 y.o.  Date of Evaluation:  2025      Ear Make and Model Serial Number /  Tube size Dome Warranty Expiration   Right Phonak Audeo L90 R [id number]  0 moderate medium open 2025   Left Phonak Audeo L90 R [id number] 0 moderate medium open 2025      HISTORY  Wilbert Lobo \"Bart\" was seen today for a hearing aid check due to known bilateral sensorineural hearing loss and bilateral constant tinnitus. He has sensitivity to loud sounds in the left ear. He has not worn the devices as consistently due to recent shoulder surgery. Feels tinnitus is louder than usual. Still has difficulty understand speech/articulation with his daughter and wife.    Denies vertigo, aural pain, drainage, fullness, history of familial hearing loss.    PROCEDURE:  HEARING AID CHECK  Listening check of both devices is adequate.  Changed to 5.0 receivers  Counseled on domes  Reset gain to prescribed gain and reduced G50 3 steps overall and all gain globally due to hearing additional sound at end of speech sounds.  Increased Noise Block and Soft Noise Reduction in the Auto[employer]ense Speech in Loud Noise setting.  Programs as follows:  P1: Autosense  P2: Music 1  P3: Speech in 360  P4: Speech in Loud Noise    EVALUATION  See scanned Audiogram in \"Media\".    IMPRESSIONS:  Hearing aids are functioning adequate, Replaced receivers to 5.0 and counseled on changing wax traps. Provided extra domes and wax traps. Changed programming in AutoSense only (increase in gain overall, bilaterally). Increased Noise Block and Soft Noise Reduction in Speech in Loud Noise. Encouraged Mr. Lobo to utilize new settings without adjustments for 1 week and then try to make volume adjustments as needed for several weeks.    Send hearing aids into [employer] for clean/check/rechargeable battery replacement in 2025 prior to warranty expires.    RECOMMENDATIONS:  Continue " "medical follow-up with physician.  Return for audiologic assessment in conjunction with otologic care or annually. October 2025.  Return in 6 months for a hearing aid check and hearing evaluation. Use of binaural hearing aids during all waking moments.  Communication strategies were discussed (utilizing visual cues/gestures; reducing background noise and distance from desired source; increasing light to assist with visual cues; use of clear speech).     PATIENT EDUCATION:   Discussed results and recommendations with Wilbert Lobo \"Bart\".  Questions were addressed and the patient was encouraged to contact our department ([phone]) should concerns arise.    RAFIA Ruiz, CCC-A  Senior Clinical Audiologist    TIME: [phone]  "

## 2025-05-28 ENCOUNTER — APPOINTMENT (OUTPATIENT)
Dept: RHEUMATOLOGY | Facility: CLINIC | Age: 65
End: 2025-05-28
Payer: COMMERCIAL

## 2025-06-26 ENCOUNTER — APPOINTMENT (OUTPATIENT)
Facility: CLINIC | Age: 65
End: 2025-06-26
Payer: COMMERCIAL

## 2025-09-04 ENCOUNTER — TELEPHONE (OUTPATIENT)
Facility: CLINIC | Age: 65
End: 2025-09-04
Payer: COMMERCIAL

## 2025-09-10 ENCOUNTER — APPOINTMENT (OUTPATIENT)
Facility: CLINIC | Age: 65
End: 2025-09-10
Payer: COMMERCIAL

## 2025-09-24 ENCOUNTER — APPOINTMENT (OUTPATIENT)
Facility: CLINIC | Age: 65
End: 2025-09-24
Payer: COMMERCIAL

## 2025-11-14 ENCOUNTER — APPOINTMENT (OUTPATIENT)
Dept: OTOLARYNGOLOGY | Facility: CLINIC | Age: 65
End: 2025-11-14
Payer: COMMERCIAL

## 2025-11-14 ENCOUNTER — APPOINTMENT (OUTPATIENT)
Dept: AUDIOLOGY | Facility: CLINIC | Age: 65
End: 2025-11-14
Payer: COMMERCIAL

## 2025-11-17 ENCOUNTER — APPOINTMENT (OUTPATIENT)
Dept: AUDIOLOGY | Facility: CLINIC | Age: 65
End: 2025-11-17
Payer: COMMERCIAL

## 2025-11-17 ENCOUNTER — APPOINTMENT (OUTPATIENT)
Dept: OTOLARYNGOLOGY | Facility: CLINIC | Age: 65
End: 2025-11-17
Payer: COMMERCIAL